# Patient Record
Sex: FEMALE | Race: WHITE | Employment: PART TIME | URBAN - METROPOLITAN AREA
[De-identification: names, ages, dates, MRNs, and addresses within clinical notes are randomized per-mention and may not be internally consistent; named-entity substitution may affect disease eponyms.]

---

## 2023-08-16 ENCOUNTER — OFFICE VISIT (OUTPATIENT)
Dept: ENT CLINIC | Age: 68
End: 2023-08-16
Payer: MEDICARE

## 2023-08-16 VITALS — HEIGHT: 66 IN | RESPIRATION RATE: 16 BRPM | WEIGHT: 242 LBS | BODY MASS INDEX: 38.89 KG/M2

## 2023-08-16 DIAGNOSIS — H91.93 BILATERAL HEARING LOSS, UNSPECIFIED HEARING LOSS TYPE: Primary | ICD-10-CM

## 2023-08-16 DIAGNOSIS — H91.90 HEARING LOSS, UNSPECIFIED HEARING LOSS TYPE, UNSPECIFIED LATERALITY: Primary | ICD-10-CM

## 2023-08-16 PROCEDURE — 99203 OFFICE O/P NEW LOW 30 MIN: CPT | Performed by: OTOLARYNGOLOGY

## 2023-08-16 PROCEDURE — 1123F ACP DISCUSS/DSCN MKR DOCD: CPT | Performed by: OTOLARYNGOLOGY

## 2023-08-16 RX ORDER — PRAVASTATIN SODIUM 80 MG/1
TABLET ORAL
COMMUNITY
Start: 2021-06-01

## 2023-08-16 RX ORDER — VENLAFAXINE HYDROCHLORIDE 75 MG/1
75 CAPSULE, EXTENDED RELEASE ORAL DAILY
COMMUNITY
Start: 2021-03-30

## 2023-08-16 RX ORDER — TORSEMIDE 20 MG/1
1 TABLET ORAL DAILY
COMMUNITY
Start: 2023-04-25

## 2023-08-16 RX ORDER — ASPIRIN 81 MG/1
81 TABLET ORAL DAILY
COMMUNITY
Start: 2017-12-13

## 2023-08-16 RX ORDER — LANSOPRAZOLE 30 MG/1
30 CAPSULE, DELAYED RELEASE ORAL DAILY
COMMUNITY
Start: 2021-02-19

## 2023-08-16 RX ORDER — TRAZODONE HYDROCHLORIDE 50 MG/1
100 TABLET ORAL NIGHTLY
COMMUNITY
Start: 2023-03-29

## 2023-08-16 NOTE — PROGRESS NOTES
[address]      Patient Name: [address] [place] [address] Record Number:  [id number]  Primary Care Physician:  Fidencio Trejo MD  Date of Consultation: 8/16/2023    Chief Complaint: Ear issues        HISTORY OF PRESENT ILLNESS  Quintin Wood is a(n) 69 y.o. female who presents for evaluation of ear issues. The patient has had hearing loss for a while. She has had hearing aids since 2016. She does not have a significant ear history still feels as though it was likely secondary to age. She has been having issues the hearing aids. They have been whistling. She is not getting as good of results with them as she used to. She is worried perhaps she has some earwax. She does not have any ear pain. REVIEW OF SYSTEMS  As above    PHYSICAL EXAM  GENERAL: No Acute Distress, Alert and Oriented, no Hoarseness, strong voice  EYES: EOMI, Anti-icteric  HENT:   Head: Normocephalic and atraumatic. Face:  Symmetric, facial nerve intact, no sinus tenderness  Ears: See below  Mouth/Oral Cavity:  normal lips, Uvula is midline, no mucosal lesions, no trismus  Oropharynx/Larynx:  normal oropharynx, absent tonsils  Nose:Normal external nasal appearance. NECK: Normal range of motion, no thyromegaly, trachea is midline, no lymphadenopathy, no neck masses, no crepitus    Bilateral ear exam  Right ear was visualized microscope. Tympanic membrane intact with an aerated middle ear. On the left side tympanic membrane intact with an aerated middle ear. ASSESSMENT/PLAN  1. Bilateral hearing loss, unspecified hearing loss type  She did not have any significant cerumen impactions today. It sounds as though either she has had a bit of progression of her hearing loss or the hearing aids are not functioning as well. I would like to get a new hearing test.  I have asked her to also get me a copy of her previous audiogram that way we can compare them.   I will

## 2023-08-28 ENCOUNTER — OFFICE VISIT (OUTPATIENT)
Dept: ENT CLINIC | Age: 68
End: 2023-08-28
Payer: MEDICARE

## 2023-08-28 ENCOUNTER — PROCEDURE VISIT (OUTPATIENT)
Dept: AUDIOLOGY | Age: 68
End: 2023-08-28
Payer: MEDICARE

## 2023-08-28 VITALS
WEIGHT: 256 LBS | HEIGHT: 66 IN | BODY MASS INDEX: 41.14 KG/M2 | OXYGEN SATURATION: 98 % | SYSTOLIC BLOOD PRESSURE: 127 MMHG | DIASTOLIC BLOOD PRESSURE: 76 MMHG | HEART RATE: 95 BPM

## 2023-08-28 DIAGNOSIS — H90.3 SENSORINEURAL HEARING LOSS (SNHL) OF BOTH EARS: Primary | ICD-10-CM

## 2023-08-28 PROCEDURE — 92557 COMPREHENSIVE HEARING TEST: CPT | Performed by: AUDIOLOGIST

## 2023-08-28 PROCEDURE — 99213 OFFICE O/P EST LOW 20 MIN: CPT | Performed by: OTOLARYNGOLOGY

## 2023-08-28 PROCEDURE — 92567 TYMPANOMETRY: CPT | Performed by: AUDIOLOGIST

## 2023-08-28 PROCEDURE — 1123F ACP DISCUSS/DSCN MKR DOCD: CPT | Performed by: OTOLARYNGOLOGY

## 2023-08-28 NOTE — PROGRESS NOTES
[facility]- HEAD & NECK SURGERY  Follow up      Patient Name: [address] [place] [address] Record Number:  [id number]  Primary Care Physician:  Tano Simmons MD  Date of Consultation: 8/28/2023    Chief Complaint: Hearing loss        Interval History    Patient following up for hearing loss. She had an audiogram today. She has a long history of hearing loss and uses hearing aids. She says that she feels as though they do not work as well she would like. REVIEW OF SYSTEMS  As above    PHYSICAL EXAM  GENERAL: No Acute Distress, Alert and Oriented, no Hoarseness, strong voice  EYES: EOMI, Anti-icteric  HENT:   Head: Normocephalic and atraumatic. Face:  Symmetric, facial nerve intact, no sinus tenderness  Right Ear: Normal external ear, normal external auditory canal, intact tympanic membrane with normal mobility and aerated middle ear  Left Ear: Normal external ear, normal external auditory canal, intact tympanic membrane with normal mobility and aerated middle ear        Patient had an audiogram today that shows mild sloping profound sensorineural hearing loss with fair word recognition scores. ASSESSMENT/PLAN  1. Sensorineural hearing loss (SNHL) of both ears  She has severe sensorineural hearing loss on both sides. She only has fair word recognition scores. I do think that she probably at least needs upgraded hearing aids. Her word recognition scores not poor, but they are not great. I did talk to her about going to [facility] just to make sure she is not a cochlear implant candidate. She is going to think about this and let me know if she would like to do it. I have performed a head and neck physical exam personally or was physically present during the key or critical portions of the service. This note was generated completely or in part utilizing Dragon dictation speech recognition software.   Occasionally, words are

## 2023-08-28 NOTE — PROGRESS NOTES
Chuy Love   1955, 69 y.o. female   [id number]       Referring Provider: Meliton Bender MD  Referral Type: In an order in [address] [place] [address]    Reason for Visit: Evaluation of suspected change in hearing, tinnitus, or balance. ADULT AUDIOLOGIC EVALUATION      Chuy Love is a 69 y.o. female seen today, 8/28/2023 , for an initial audiologic evaluation. Patient was seen by Meliton Bender MD following today's evaluation. AUDIOLOGIC AND OTHER PERTINENT MEDICAL HISTORY:      Chuy Love noted aural fullness. Patient was last seen by Meliton Bender MD on 8/16/23 with concerns of bilateral cerumen impaction. At this time, patient reported decreased hearing with the use of her hearing aids and feedback from her hearing aids. Patient noted her Phonak Virto V90 hearing aids were purchased in 2016 . She has known bilateral SNHL. Patient was not found to have significant cerumen in ear canals at this visit. Today, patient notes hearing aid issues have not resolved. She states she has not returned to her dispensing audiologist to have the hearing aids adjusted. Chuy Love denied otalgia, otorrhea, tinnitus, dizziness, imbalance, history of falls, history of significant noise exposure, history of head trauma, history of ear surgery, and family history of hearing loss. Date: 8/28/2023     IMPRESSIONS:      AU: Mild sloping to Profound SNHL, Fair WRS, Type A tymp    Test results consistent with bilateral Sensorineural hearing loss. Hearing loss significant enough to create hearing difficulty in most listening situations. Discussed hearing loss and hearing aids with patient. Discussed options of returning to dispensing audiologist for hearing aid adjustments, coming to [facility] (Quoted $150.00 for initial and $60-$75 for follow up), or considering new hearing aids.    Patient to follow medical recommendations per Meliton Bender MD .    ASSESSMENT AND FINDINGS:     Otoscopy revealed: Clear

## 2023-08-28 NOTE — PATIENT INSTRUCTIONS
can help some people with hearing problems to hear better. Hearing aids do not restore normal hearing. But they can make it easier to communicate by making sounds clearer. Digital programmable hearing aids can adjust themselves to work best where you are at any time. You also have more choices in setting them up than with analog hearing aids. There are also different styles of hearing aids. A behind-the-ear (BTE) hearing aid connects to a plastic ear mold that fits inside the outer ear. BTE hearing aids are used for all levels of hearing loss, especially very severe hearing loss. They may be better for children for safety and growth reasons. Poorly fitting BTE ear molds or a buildup of earwax may cause a whistling sound (feedback). An in-the-ear (ITE) hearing aid fits in the outer part of the ear. It can be used by people with mild to severe hearing loss. ITE hearing aids can be used with other hearing devices, such as a telecoil that improves hearing during phone calls. ITE hearing aids can be damaged by earwax and fluid draining from the ear. Their small size may be hard for some people to handle. They are not often used in children because the case must be replaced as the child grows. An in-the-canal (ITC) hearing aid fits into the ear canal. ITC hearing aids are used by people with mild to moderate hearing loss. They are made to fit the shape and the size of your ear canal. They can be damaged by earwax and fluid draining from the ear. Their small size may be hard for some people to handle. They are not made for children. You have some options if you have a hearing problem and are thinking about getting hearing aids. You can go to your doctor or an audiologist. He or she will do a hearing test and help you decide which type and style of hearing aid may be best for you. What else should I know about hearing aids? Find out if your insurance covers hearing aids. They can be expensive.  Different